# Patient Record
Sex: MALE | Race: BLACK OR AFRICAN AMERICAN | Employment: FULL TIME | URBAN - METROPOLITAN AREA
[De-identification: names, ages, dates, MRNs, and addresses within clinical notes are randomized per-mention and may not be internally consistent; named-entity substitution may affect disease eponyms.]

---

## 2020-02-03 ENCOUNTER — HOSPITAL ENCOUNTER (EMERGENCY)
Facility: CLINIC | Age: 40
Discharge: HOME | End: 2020-02-03
Attending: EMERGENCY MEDICINE | Admitting: EMERGENCY MEDICINE
Payer: OTHER MISCELLANEOUS

## 2020-02-03 ENCOUNTER — APPOINTMENT (OUTPATIENT)
Dept: CT IMAGING | Facility: CLINIC | Age: 40
End: 2020-02-03
Attending: EMERGENCY MEDICINE
Payer: OTHER MISCELLANEOUS

## 2020-02-03 VITALS
WEIGHT: 220 LBS | TEMPERATURE: 98 F | RESPIRATION RATE: 14 BRPM | SYSTOLIC BLOOD PRESSURE: 177 MMHG | DIASTOLIC BLOOD PRESSURE: 113 MMHG | HEIGHT: 72 IN | HEART RATE: 61 BPM | BODY MASS INDEX: 29.8 KG/M2 | OXYGEN SATURATION: 100 %

## 2020-02-03 DIAGNOSIS — S06.0X0A CONCUSSION WITHOUT LOSS OF CONSCIOUSNESS, INITIAL ENCOUNTER: ICD-10-CM

## 2020-02-03 DIAGNOSIS — R22.0 FACIAL SWELLING: ICD-10-CM

## 2020-02-03 PROCEDURE — 25000125 ZZHC RX 250

## 2020-02-03 PROCEDURE — 99284 EMERGENCY DEPT VISIT MOD MDM: CPT | Mod: 25

## 2020-02-03 PROCEDURE — 70450 CT HEAD/BRAIN W/O DYE: CPT

## 2020-02-03 PROCEDURE — 25000132 ZZH RX MED GY IP 250 OP 250 PS 637: Performed by: EMERGENCY MEDICINE

## 2020-02-03 PROCEDURE — 70486 CT MAXILLOFACIAL W/O DYE: CPT

## 2020-02-03 RX ORDER — IBUPROFEN 600 MG/1
600 TABLET, FILM COATED ORAL ONCE
Status: COMPLETED | OUTPATIENT
Start: 2020-02-03 | End: 2020-02-03

## 2020-02-03 RX ORDER — ONDANSETRON 4 MG/1
4 TABLET, ORALLY DISINTEGRATING ORAL EVERY 8 HOURS PRN
Qty: 10 TABLET | Refills: 0 | Status: SHIPPED | OUTPATIENT
Start: 2020-02-03 | End: 2020-02-06

## 2020-02-03 RX ORDER — PROPARACAINE HYDROCHLORIDE 5 MG/ML
SOLUTION/ DROPS OPHTHALMIC
Status: COMPLETED
Start: 2020-02-03 | End: 2020-02-03

## 2020-02-03 RX ORDER — ACETAMINOPHEN 500 MG
1000 TABLET ORAL ONCE
Status: COMPLETED | OUTPATIENT
Start: 2020-02-03 | End: 2020-02-03

## 2020-02-03 RX ORDER — PROPARACAINE HYDROCHLORIDE 5 MG/ML
1 SOLUTION/ DROPS OPHTHALMIC ONCE
Status: COMPLETED | OUTPATIENT
Start: 2020-02-03 | End: 2020-02-03

## 2020-02-03 RX ADMIN — PROPARACAINE HYDROCHLORIDE 1 DROP: 5 SOLUTION/ DROPS OPHTHALMIC at 12:33

## 2020-02-03 RX ADMIN — IBUPROFEN 600 MG: 600 TABLET ORAL at 11:11

## 2020-02-03 RX ADMIN — FLUORESCEIN SODIUM 1 MG: 1 STRIP OPHTHALMIC at 12:33

## 2020-02-03 RX ADMIN — ACETAMINOPHEN 1000 MG: 500 TABLET, FILM COATED ORAL at 11:10

## 2020-02-03 SDOH — HEALTH STABILITY: MENTAL HEALTH: HOW OFTEN DO YOU HAVE A DRINK CONTAINING ALCOHOL?: NEVER

## 2020-02-03 ASSESSMENT — MIFFLIN-ST. JEOR: SCORE: 1945.91

## 2020-02-03 ASSESSMENT — ENCOUNTER SYMPTOMS
HEADACHES: 1
WOUND: 1

## 2020-02-03 NOTE — ED AVS SNAPSHOT
Emergency Department  [address] [place] [id number]  Phone:  [phone]  Fax:  [phone]                                    Scott Pena   MRN: [id number]    Department:   Emergency Department   Date of Visit:  2/3/2020           After Visit Summary Signature Page    I have received my discharge instructions, and my questions have been answered. I have discussed any challenges I see with this plan with the nurse or doctor.    ..........................................................................................................................................  Patient/Patient Representative Signature      ..........................................................................................................................................  Patient Representative Print Name and Relationship to Patient    ..................................................               ................................................  Date                                   Time    ..........................................................................................................................................  Reviewed by Signature/Title    ...................................................              ..............................................  Date                                               Time          22EPIC Rev 08/18

## 2020-02-03 NOTE — ED PROVIDER NOTES
History     Chief Complaint:  Head Injury    HPI   Scott Pena is a 39 year old male who presents to the emergency department today for evaluation of a head injury. The patient reports that he was working today and attempting to remove some pressurized pipes when he struck a pipe with a sledge hammer and it burst, resulting in rocks impacting his face. Here he endorses a severe headache and notes a right cheek abrasion with associated pain. Of note, the patient's last tetanus vaccine was administered on 3/30/17 per [facility].    Allergies:  No Known Drug Allergies    Medications:    Medications reviewed. No current medications.     Past Medical History:    Medical history reviewed. No pertinent medical history.    Past Surgical History:    Surgical history reviewed. No pertinent surgical history.    Family History:    Family history reviewed. No pertinent family history.     Social History:  The patient presented to the ED alone.  Patient works in construction.    Review of Systems   Skin: Positive for wound.   Neurological: Positive for headaches.   All other systems reviewed and are negative.      Physical Exam     Patient Vitals for the past 24 hrs:   BP Temp Temp src Pulse Resp SpO2 Height Weight   02/03/20 1200 (!) 177/113 -- -- -- 14 100 % -- --   02/03/20 1035 (!) 178/90 98  F (36.7  C) Temporal 61 16 100 % 1.829 m (6') 99.8 kg (220 lb)     Physical Exam  Vitals: reviewed by me  General: Pt seen on hospital [facility], pleasant, cooperative, and alert to conversation  Eyes: Tracking well, clear conjunctiva BL, EOMI BL, [facility].  ENT: MMM, midline trachea.   Minimal evidence of trauma to right zygomatic arch, small skin break in this area.  Not amenable to repair, minimal tenderness in this area.  Full range of motion to neck, no C-spine tenderness.  Lungs:   No tachypnea, no accessory muscle use. No respiratory distress.   CV: Rate as above, regular rhythm.    Abd:  Soft, non tender, no guarding, no rebound. Non distended  MSK: no peripheral edema or joint effusion.  No evidence of trauma  Skin: No rash, normal turgor and temperature  Neuro: Clear speech and no facial droop.  Psych: Not RIS, no e/o AH/VH      Emergency Department Course     Imaging:  Radiology findings were communicated with the patient who voiced understanding of the findings.    CT Facial Bones without Contrast  Minimal soft tissue swelling anterior to the right malar eminence.  Reading per radiology    CT Head w/o Contrast  Normal CT scan of the head.   Reading per radiology    Interventions:  1110 Tylenol 1000 mg Oral  1111 Ibuprofen 600 mg Oral  1233 Fluorescein 1 mg   1233 Alcaine 1 drop right eye    Emergency Department Course:    1045 Nursing notes and vitals reviewed.    1051 I performed an exam of the patient as documented above.     1116 The patient was sent for CTs while in the emergency department, results above.     1217 I performed exams of the patient's eyes as noted above.     Findings and plan explained to the patient. Patient discharged home with instructions regarding supportive care, medications, and reasons to return. The importance of close follow-up was reviewed. The patient was prescribed zofran.     Impression & Plan      Medical Decision Making:  Scott Pena is a 39 year old male who presents to the emergency department today for evaluation of what appears to be facial swelling from a blast injury. He states that he feels like his ears did not pop. His main issue is facial swelling over the right cheek. CT scan shows no foreign body embedded, nothing in his orbit, and no broken bones over his zygomatic arch, which is minimally tender to palpation. His visual acuity was normal for me in the room, but he did state that he feels slight blurriness. For this reason a fluorescein stain was done. His conjunctiva are non injected. He had no fluoro  uptake in either eye. I do think the  patient is stable for discharge to home. Will treat for concussion and give several days off of work. He knows to see his primary care provider before going back to work for concussion symptoms. He is otherwise doing well. Will discharge as above.     Diagnosis:    ICD-10-CM    1. Concussion without loss of consciousness, initial encounter S06.0X0A    2. Facial swelling R22.0      Disposition:   The patient is discharged to home.    Discharge Medications:  Discharge Medication List as of 2/3/2020 12:34 PM      START taking these medications    Details   ondansetron (ZOFRAN ODT) 4 MG ODT tab Take 1 tablet (4 mg) by mouth every 8 hours as needed, Disp-10 tablet, R-0, Local Print           Scribe Disclosure:  I, Luzma Riley, am serving as a scribe at 10:56 AM on 2/3/2020 to document services personally performed by Vahe Russell MD based on my observations and the provider's statements to me.     EMERGENCY DEPARTMENT       Vahe Russell MD  02/03/20 1727